# Patient Record
Sex: FEMALE | Race: OTHER | NOT HISPANIC OR LATINO | URBAN - METROPOLITAN AREA
[De-identification: names, ages, dates, MRNs, and addresses within clinical notes are randomized per-mention and may not be internally consistent; named-entity substitution may affect disease eponyms.]

---

## 2019-11-13 ENCOUNTER — APPOINTMENT (RX ONLY)
Dept: URBAN - METROPOLITAN AREA CLINIC 23 | Facility: CLINIC | Age: 44
Setting detail: DERMATOLOGY
End: 2019-11-13

## 2019-11-13 DIAGNOSIS — Z41.9 ENCOUNTER FOR PROCEDURE FOR PURPOSES OTHER THAN REMEDYING HEALTH STATE, UNSPECIFIED: ICD-10-CM

## 2019-11-13 PROBLEM — F41.9 ANXIETY DISORDER, UNSPECIFIED: Status: ACTIVE | Noted: 2019-11-13

## 2019-11-13 PROBLEM — F32.9 MAJOR DEPRESSIVE DISORDER, SINGLE EPISODE, UNSPECIFIED: Status: ACTIVE | Noted: 2019-11-13

## 2019-11-13 PROCEDURE — ? ADDITIONAL NOTES

## 2019-11-13 NOTE — PROCEDURE: MIPS QUALITY
Additional Notes: Pain 0/10
Detail Level: Detailed
Quality 131: Pain Assessment And Follow-Up: Pain assessment using a standardized tool is documented as negative, no follow-up plan required

## 2019-12-13 ENCOUNTER — APPOINTMENT (RX ONLY)
Dept: URBAN - METROPOLITAN AREA CLINIC 23 | Facility: CLINIC | Age: 44
Setting detail: DERMATOLOGY
End: 2019-12-13

## 2019-12-13 DIAGNOSIS — Z41.9 ENCOUNTER FOR PROCEDURE FOR PURPOSES OTHER THAN REMEDYING HEALTH STATE, UNSPECIFIED: ICD-10-CM

## 2019-12-13 PROCEDURE — ? PROFOUND

## 2019-12-13 PROCEDURE — ? PULSED-DYE LASER

## 2019-12-13 NOTE — PROCEDURE: PROFOUND
Consent: Written consent obtained, risks reviewed including but not limited to crusting, scabbing, blistering, scarring, darker or lighter pigmentary change, and/or incomplete improvement.
Local Anesthesia: 0.1% lidocaine with 1:1,000,000 epinephrine (tumescent anesthesia)
Spacinmm
Device Serial Number (Optional): [id number]
External Cooling Fan Speed: 5
Pre-Procedure Text: After consent was obtained the treatment areas were cleaned and treated using the parameters mentioned above.
End Time (Optional): 11:45
Detail Level: Detailed
Temperature (Optional): 6640 [facility]
Temperature (Optional): 67
Total Insertions (Required): 100
Treatment Number: 1
Length Topical Anesthesia Applied (Optional): 20 minutes
Anesthesia Volume In Cc: 15
Location: Above right knee
Post-Care Instructions: I reviewed with the patient in detail post-care instructions. Patient should stay away from the sun and wear sun protection until fully healed.
Temperature (Optional): 67c
Post-Procedure Text: Following the procedure, post care was reviewed with the patient. Recommended Alastin skin nectar and arnica topical and tablets fro swelling and bruising.
Topical Anesthesia?: 20% benzocaine, 8% lidocaine, and 8% tetracaine
Pre-Treatment Photos?: Yes

## 2019-12-13 NOTE — PROCEDURE: MIPS QUALITY
Additional Notes: Negative pain 0/10
Detail Level: Detailed
Quality 131: Pain Assessment And Follow-Up: Pain assessment using a standardized tool is documented as negative, no follow-up plan required
Quality 130: Documentation Of Current Medications In The Medical Record: Current Medications Documented

## 2019-12-13 NOTE — PROCEDURE: PULSED-DYE LASER
Pulse Duration: 10 ms
Laser Type: Vbeam 595nm
Spot Size: 10 mm
Treated Area: small area
Spot Size: 7 mm
Delay Time (Ms): 10
Cryogen Time (Ms): 27
Cryogen Time (Ms): 36
Spot Size: 10x3 mm
Fluence In J/Cm2 (Optional): 6.00
Post-Care Instructions: I reviewed with the patient in detail post-care instructions. Patient should stay away from the sun and wear sun protection until treated areas are fully healed.
Treated Area: medium area
Cryogen Time (Ms): [id number] [address]
Detail Level: Detailed
Fluence In J/Cm2 (Optional): 6.50
Cryogen Time (Ms): 30
Immediate Endpoint: erythema
Delay Time (Ms): 2538 [facility]
Delay Time (Ms): 0
Location Override: bruising post cosmetic
Pulse Duration: 6 ms
Consent: Written consent obtained, risks reviewed including but not limited to crusting, scabbing, blistering, scarring, darker or lighter pigmentary change, incidental hair removal, bruising, and/or incomplete removal.
Delay Time (Ms): 20

## 2019-12-16 ENCOUNTER — APPOINTMENT (RX ONLY)
Dept: URBAN - METROPOLITAN AREA CLINIC 23 | Facility: CLINIC | Age: 44
Setting detail: DERMATOLOGY
End: 2019-12-16

## 2019-12-16 DIAGNOSIS — Z41.9 ENCOUNTER FOR PROCEDURE FOR PURPOSES OTHER THAN REMEDYING HEALTH STATE, UNSPECIFIED: ICD-10-CM

## 2019-12-16 PROCEDURE — ? PULSED-DYE LASER

## 2019-12-16 NOTE — PROCEDURE: PULSED-DYE LASER
Post-Care Instructions: I reviewed with the patient in detail post-care instructions. Patient should stay away from the sun and wear sun protection until treated areas are fully healed.
Delay Time (Ms): 6[address] [facility]
Fluence In J/Cm2 (Optional): 6.50
Delay Time (Ms): 20
Cryogen Time (Ms): [address]288 [address]
Spot Size: 10 mm
Cryogen Time (Ms): [address]
Pulse Duration: 6 ms
Treated Area: small area
Immediate Endpoint: erythema
Laser Type: Vbeam 595nm
Location (Required For Details To Render In Note But Body Touch Will Also Count For First Location): lower face
Pulse Duration: 10 ms
Consent: Written consent obtained, risks reviewed including but not limited to crusting, scabbing, blistering, scarring, darker or lighter pigmentary change, incidental hair removal, bruising, and/or incomplete removal.
Cryogen Time (Ms): 30
Spot Size: 7 mm
Treated Area: medium area
Fluence In J/Cm2 (Optional): 6.00
Delay Time (Ms): 10
Delay Time (Ms): 0
Detail Level: Detailed

## 2020-08-07 ENCOUNTER — APPOINTMENT (RX ONLY)
Dept: URBAN - METROPOLITAN AREA CLINIC 23 | Facility: CLINIC | Age: 45
Setting detail: DERMATOLOGY
End: 2020-08-07

## 2020-08-07 DIAGNOSIS — Z41.9 ENCOUNTER FOR PROCEDURE FOR PURPOSES OTHER THAN REMEDYING HEALTH STATE, UNSPECIFIED: ICD-10-CM

## 2020-08-07 PROCEDURE — ? HYDRAFACIAL

## 2020-08-07 ASSESSMENT — LOCATION SIMPLE DESCRIPTION DERM: LOCATION SIMPLE: RIGHT CHEEK

## 2020-08-07 ASSESSMENT — LOCATION DETAILED DESCRIPTION DERM: LOCATION DETAILED: RIGHT CENTRAL MALAR CHEEK

## 2020-08-07 ASSESSMENT — LOCATION ZONE DERM: LOCATION ZONE: FACE

## 2020-08-07 NOTE — PROCEDURE: HYDRAFACIAL
Post-Care Instructions: I reviewed with the patient in detail post-care instructions. Patient should stay away from the sun and wear sun protection until treated areas are fully healed.
Number Of Passes: 2
Additional Vacuum Pressure (Won't Render If 0): 0
Treatment Number: 1
Consent: Written consent obtained, risks reviewed including but not limited to crusting, scabbing, blistering, scarring, darker or lighter pigmentary change, bruising, and/or incomplete response.
Indication: skin texture
Detail Level: Zone
Vacuum Pressure: [address]

## 2020-10-02 ENCOUNTER — APPOINTMENT (RX ONLY)
Dept: URBAN - METROPOLITAN AREA CLINIC 23 | Facility: CLINIC | Age: 45
Setting detail: DERMATOLOGY
End: 2020-10-02

## 2020-10-02 DIAGNOSIS — Z02.9 ENCOUNTER FOR ADMINISTRATIVE EXAMINATIONS, UNSPECIFIED: ICD-10-CM

## 2020-10-02 PROCEDURE — ? NO SHOW PLAN

## 2021-10-29 ENCOUNTER — RX ONLY (OUTPATIENT)
Age: 46
Setting detail: RX ONLY
End: 2021-10-29

## 2021-10-29 ENCOUNTER — APPOINTMENT (RX ONLY)
Dept: URBAN - METROPOLITAN AREA CLINIC 23 | Facility: CLINIC | Age: 46
Setting detail: DERMATOLOGY
End: 2021-10-29

## 2021-10-29 DIAGNOSIS — Z41.9 ENCOUNTER FOR PROCEDURE FOR PURPOSES OTHER THAN REMEDYING HEALTH STATE, UNSPECIFIED: ICD-10-CM

## 2021-10-29 PROCEDURE — ? PULSED-DYE LASER

## 2021-10-29 RX ORDER — CLINDAMYCIN PHOSPHATE AND BENZOYL PEROXIDE 10; 50 MG/G; MG/G
GEL TOPICAL
Qty: 25 | Refills: 2 | Status: ERX | COMMUNITY
Start: 2021-10-29

## 2021-10-29 NOTE — PROCEDURE: PULSED-DYE LASER
Consent: Written consent obtained, risks reviewed including but not limited to crusting, scabbing, blistering, scarring, darker or lighter pigmentary change, incidental hair removal, bruising, and/or incomplete removal.
Fluence In J/Cm2 (Optional): 6.50
Detail Level: Zone
Pulse Duration: 10 ms
Delay Time (Ms): 10
Location Override: red spot
Spot Size: 10x3 mm
Pulse Duration: 6 ms
Delay Time (Ms): [address]
Immediate Endpoint: erythema
Fluence In J/Cm2 (Optional): 10.0
Pulse Duration: 40 ms
Cryogen Time (Ms): [id number] [address]
Spot Size: 10 mm
Post-Care Instructions: I reviewed with the patient in detail post-care instructions. Patient should stay away from the sun and wear sun protection until treated areas are fully healed.
Cryogen Time (Ms): 30
Laser Type: Vbeam 595nm
Delay Time (Ms): 3136 [facility]
Treated Area: medium area
Spot Size: 7 mm
Spot Size Override: 10.
Delay Time (Ms): 20